# Patient Record
Sex: MALE | Race: ASIAN | NOT HISPANIC OR LATINO
[De-identification: names, ages, dates, MRNs, and addresses within clinical notes are randomized per-mention and may not be internally consistent; named-entity substitution may affect disease eponyms.]

---

## 2019-01-28 PROBLEM — Z00.00 ENCOUNTER FOR PREVENTIVE HEALTH EXAMINATION: Status: ACTIVE | Noted: 2019-01-28

## 2019-03-04 ENCOUNTER — APPOINTMENT (OUTPATIENT)
Dept: OTOLARYNGOLOGY | Facility: CLINIC | Age: 29
End: 2019-03-04
Payer: COMMERCIAL

## 2019-03-04 VITALS
BODY MASS INDEX: 24.38 KG/M2 | SYSTOLIC BLOOD PRESSURE: 106 MMHG | WEIGHT: 180 LBS | DIASTOLIC BLOOD PRESSURE: 62 MMHG | HEART RATE: 66 BPM | HEIGHT: 72 IN

## 2019-03-04 DIAGNOSIS — H61.23 IMPACTED CERUMEN, BILATERAL: ICD-10-CM

## 2019-03-04 DIAGNOSIS — J31.2 CHRONIC PHARYNGITIS: ICD-10-CM

## 2019-03-04 DIAGNOSIS — J30.89 OTHER ALLERGIC RHINITIS: ICD-10-CM

## 2019-03-04 DIAGNOSIS — J35.1 HYPERTROPHY OF TONSILS: ICD-10-CM

## 2019-03-04 DIAGNOSIS — F17.210 NICOTINE DEPENDENCE, CIGARETTES, UNCOMPLICATED: ICD-10-CM

## 2019-03-04 PROCEDURE — 99204 OFFICE O/P NEW MOD 45 MIN: CPT

## 2019-03-04 NOTE — PHYSICAL EXAM
[de-identified] : irrigation large amount cerumen au [Midline] : trachea located in midline position [Normal] : no rashes

## 2019-03-04 NOTE — HISTORY OF PRESENT ILLNESS
[de-identified] : co ears plugging and itching\par no other hearing problem\par co discomfort throat no dysphagia no hoarseness\par tonsillitis as child but not lately

## 2019-03-04 NOTE — REVIEW OF SYSTEMS
[Sneezing] : sneezing [Ear Pain] : ear pain [Ear Itch] : ear itch [Throat Clearing] : throat clearing [Throat Pain] : throat pain [Throat Dryness] : throat dryness [Throat Itching] : throat itching [Negative] : Heme/Lymph [Patient Intake Form Reviewed] : Patient intake form was reviewed [FreeTextEntry1] : headache  watery eyes

## 2020-11-23 ENCOUNTER — TRANSCRIPTION ENCOUNTER (OUTPATIENT)
Age: 30
End: 2020-11-23

## 2021-03-02 ENCOUNTER — TRANSCRIPTION ENCOUNTER (OUTPATIENT)
Age: 31
End: 2021-03-02

## 2021-04-09 ENCOUNTER — TRANSCRIPTION ENCOUNTER (OUTPATIENT)
Age: 31
End: 2021-04-09

## 2021-12-03 ENCOUNTER — APPOINTMENT (OUTPATIENT)
Dept: ORTHOPEDIC SURGERY | Facility: CLINIC | Age: 31
End: 2021-12-03
Payer: COMMERCIAL

## 2021-12-03 ENCOUNTER — NON-APPOINTMENT (OUTPATIENT)
Age: 31
End: 2021-12-03

## 2021-12-03 DIAGNOSIS — M77.8 OTHER ENTHESOPATHIES, NOT ELSEWHERE CLASSIFIED: ICD-10-CM

## 2021-12-03 PROCEDURE — 99204 OFFICE O/P NEW MOD 45 MIN: CPT

## 2021-12-03 PROCEDURE — 73110 X-RAY EXAM OF WRIST: CPT | Mod: RT

## 2021-12-03 RX ORDER — MELOXICAM 15 MG/1
15 TABLET ORAL
Qty: 14 | Refills: 1 | Status: ACTIVE | COMMUNITY
Start: 2021-12-03 | End: 1900-01-01

## 2021-12-03 NOTE — HISTORY OF PRESENT ILLNESS
[FreeTextEntry1] : 30 yo male presents to the office for an evaluation of right wrist pain that began approximately 1 year ago and he cannot site any injury or trauma. He states that his pain is over the dorsal aspect of his wrist joint and radiates along the radial aspect of his wrist and to his thumb. Pt does note that he has a 1 year old son that is now approximately 20 pounds and that his wrist pain is worsened with carrying his son, a gallon of milk, and really grabbing any heavy object. He was seen at an outside physician's office and treated conservatively to date with no resolution.

## 2021-12-03 NOTE — ASSESSMENT
[FreeTextEntry1] : 30 yo male presents to the office for an evaluation of right wrist pain that began approximately 1 year ago and he cannot site any injury or trauma.  Exam shows evidence of possible occult ganglion cyst \par \par - Prescription for meloxicam was provided \par - MRI of the right wrist is warranted at this time to r/o occult ganglion cyst to assess how to proceed with treatment \par - wrist brace was provided for added support and to allow him to rest his wrist \par - F/u in the office once imaging is obtained

## 2021-12-03 NOTE — PHYSICAL EXAM
[de-identified] : Left wrist exam\par \par Skin: Clean, dry, intact. No ecchymosis. No swelling. No palpable joint effusion. No palpable deformity. No crepitus\par ROM: full flexion, extension, ulnar deviation and radial deviation \par Tenderness: No tenderness to palpation at the distal radius, distal ulna, the DRUJ. negative snuffbox pain. tenderness to palpation along the dorsum of the wrist and along the extensor policus brevis. \par Stability: Stable DRUJ \par Vasc: 2+ radial pulse, <2s cap refill\par Sensation: In tact to light touch throughout\par Neuro: Negative tinels over median nerve, AIN/PIN/Ulnar nerve in tact to motor/sensation.  [de-identified] : 3V XRs of Right wrist taken on 12/3/2021: no acute fractures or dislocations

## 2021-12-10 PROBLEM — Z00.00 ENCOUNTER FOR PREVENTIVE HEALTH EXAMINATION: Status: ACTIVE | Noted: 2021-12-10

## 2021-12-14 ENCOUNTER — APPOINTMENT (OUTPATIENT)
Dept: ORTHOPEDIC SURGERY | Facility: CLINIC | Age: 31
End: 2021-12-14
Payer: COMMERCIAL

## 2021-12-14 DIAGNOSIS — M25.531 PAIN IN RIGHT WRIST: ICD-10-CM

## 2021-12-14 PROCEDURE — 99441: CPT
